# Patient Record
Sex: MALE | Race: ASIAN | NOT HISPANIC OR LATINO
[De-identification: names, ages, dates, MRNs, and addresses within clinical notes are randomized per-mention and may not be internally consistent; named-entity substitution may affect disease eponyms.]

---

## 2020-10-27 PROBLEM — Z00.00 ENCOUNTER FOR PREVENTIVE HEALTH EXAMINATION: Status: ACTIVE | Noted: 2020-10-27

## 2020-11-11 ENCOUNTER — APPOINTMENT (OUTPATIENT)
Dept: ENDOCRINOLOGY | Facility: CLINIC | Age: 60
End: 2020-11-11

## 2020-12-04 ENCOUNTER — APPOINTMENT (OUTPATIENT)
Dept: ENDOCRINOLOGY | Facility: CLINIC | Age: 60
End: 2020-12-04

## 2020-12-08 ENCOUNTER — APPOINTMENT (OUTPATIENT)
Dept: ENDOCRINOLOGY | Facility: CLINIC | Age: 60
End: 2020-12-08

## 2021-01-28 ENCOUNTER — NON-APPOINTMENT (OUTPATIENT)
Age: 61
End: 2021-01-28

## 2021-01-28 ENCOUNTER — APPOINTMENT (OUTPATIENT)
Dept: ELECTROPHYSIOLOGY | Facility: CLINIC | Age: 61
End: 2021-01-28
Payer: COMMERCIAL

## 2021-01-28 VITALS
RESPIRATION RATE: 16 BRPM | OXYGEN SATURATION: 97 % | SYSTOLIC BLOOD PRESSURE: 144 MMHG | DIASTOLIC BLOOD PRESSURE: 88 MMHG | BODY MASS INDEX: 32.21 KG/M2 | TEMPERATURE: 96.4 F | HEART RATE: 81 BPM | WEIGHT: 225 LBS | HEIGHT: 70 IN

## 2021-01-28 DIAGNOSIS — Z86.39 PERSONAL HISTORY OF OTHER ENDOCRINE, NUTRITIONAL AND METABOLIC DISEASE: ICD-10-CM

## 2021-01-28 DIAGNOSIS — Z78.9 OTHER SPECIFIED HEALTH STATUS: ICD-10-CM

## 2021-01-28 DIAGNOSIS — R06.00 DYSPNEA, UNSPECIFIED: ICD-10-CM

## 2021-01-28 PROCEDURE — 99205 OFFICE O/P NEW HI 60 MIN: CPT

## 2021-01-28 PROCEDURE — 99072 ADDL SUPL MATRL&STAF TM PHE: CPT

## 2021-01-28 PROCEDURE — 93000 ELECTROCARDIOGRAM COMPLETE: CPT

## 2021-01-28 RX ORDER — RIVAROXABAN 20 MG/1
20 TABLET, FILM COATED ORAL
Qty: 90 | Refills: 3 | Status: ACTIVE | COMMUNITY
Start: 2021-01-28

## 2021-01-28 NOTE — DISCUSSION/SUMMARY
[FreeTextEntry1] : Jayson Tim is a 59y/o man with Hx of HTN, HLD, DMII, and persistent afib, on Xarelto, who presents today for initial evaluation.\par \par Impression:\par \par 1. Persistent afib: EKG today afib. Discussed treatment options for afib including rate control vs antiarrhythmics vs possible ablation. Given persistent afib with dyspnea on exertion despite rate control management and young age/preference to avoid antiarrhythmics, recommend undergoing CAROLINE/DCCV to restore NSR and possible afib ablation in future. Risks, benefits, and alternatives discussed. \par \par 2. HTN: resume oral antihypertensives as prescribed. Encouraged heart healthy diet, sodium restriction, and weight loss. Continue regular f/u with Cardiologist for further HTN management.\par \par 3. HLD: resume statin therapy as prescribed and regular f/u with Cardiologist for routine lipid monitoring and management.\par \par Plan for CAROLINE/DCCV.

## 2021-01-28 NOTE — PHYSICAL EXAM
[General Appearance - Well Developed] : well developed [Normal Appearance] : normal appearance [Well Groomed] : well groomed [General Appearance - Well Nourished] : well nourished [No Deformities] : no deformities [General Appearance - In No Acute Distress] : no acute distress [Normal Conjunctiva] : the conjunctiva exhibited no abnormalities [Eyelids - No Xanthelasma] : the eyelids demonstrated no xanthelasmas [Normal Oral Mucosa] : normal oral mucosa [No Oral Pallor] : no oral pallor [No Oral Cyanosis] : no oral cyanosis [Normal Jugular Venous A Waves Present] : normal jugular venous A waves present [Normal Jugular Venous V Waves Present] : normal jugular venous V waves present [No Jugular Venous Perry A Waves] : no jugular venous perry A waves [Heart Sounds] : normal S1 and S2 [Murmurs] : no murmurs present [Respiration, Rhythm And Depth] : normal respiratory rhythm and effort [Exaggerated Use Of Accessory Muscles For Inspiration] : no accessory muscle use [Auscultation Breath Sounds / Voice Sounds] : lungs were clear to auscultation bilaterally [Abdomen Soft] : soft [Abdomen Tenderness] : non-tender [Abdomen Mass (___ Cm)] : no abdominal mass palpated [Abnormal Walk] : normal gait [Gait - Sufficient For Exercise Testing] : the gait was sufficient for exercise testing [Nail Clubbing] : no clubbing of the fingernails [Cyanosis, Localized] : no localized cyanosis [Petechial Hemorrhages (___cm)] : no petechial hemorrhages [Skin Color & Pigmentation] : normal skin color and pigmentation [] : no rash [No Venous Stasis] : no venous stasis [Skin Lesions] : no skin lesions [No Skin Ulcers] : no skin ulcer [No Xanthoma] : no  xanthoma was observed [Oriented To Time, Place, And Person] : oriented to person, place, and time [Affect] : the affect was normal [Mood] : the mood was normal [No Anxiety] : not feeling anxious [FreeTextEntry1] : irregular rate/rhythm

## 2021-01-28 NOTE — HISTORY OF PRESENT ILLNESS
[FreeTextEntry1] : José Manuel Campbell MD\par \par Jayson Tim is a 59y/o man with Hx of HTN, HLD, DMII, and persistent afib, on Xarelto, who presents today for initial evaluation. Admits being told his heart rate was irregular about 10 years ago back in [place]. Believes it may have been that way since then. Does have dyspnea on exertion. Denies chest pain, palpitations, SOB at rest, syncope or near syncope.\par \par

## 2021-01-28 NOTE — REVIEW OF SYSTEMS
[Eyeglasses] : currently wearing eyeglasses [Dyspnea on exertion] : dyspnea during exertion [Negative] : Heme/Lymph

## 2021-02-08 ENCOUNTER — APPOINTMENT (OUTPATIENT)
Dept: ENDOCRINOLOGY | Facility: CLINIC | Age: 61
End: 2021-02-08
Payer: COMMERCIAL

## 2021-02-08 VITALS
DIASTOLIC BLOOD PRESSURE: 82 MMHG | SYSTOLIC BLOOD PRESSURE: 124 MMHG | HEART RATE: 85 BPM | BODY MASS INDEX: 32.21 KG/M2 | WEIGHT: 225 LBS | TEMPERATURE: 97.3 F | OXYGEN SATURATION: 98 % | HEIGHT: 70 IN

## 2021-02-08 DIAGNOSIS — Z83.3 FAMILY HISTORY OF DIABETES MELLITUS: ICD-10-CM

## 2021-02-08 PROCEDURE — 95250 CONT GLUC MNTR PHYS/QHP EQP: CPT

## 2021-02-08 PROCEDURE — 99072 ADDL SUPL MATRL&STAF TM PHE: CPT

## 2021-02-08 PROCEDURE — 95251 CONT GLUC MNTR ANALYSIS I&R: CPT

## 2021-02-08 PROCEDURE — 99204 OFFICE O/P NEW MOD 45 MIN: CPT

## 2021-02-08 RX ORDER — GLIPIZIDE 10 MG/1
10 TABLET ORAL DAILY
Refills: 0 | Status: DISCONTINUED | COMMUNITY
Start: 2021-01-28 | End: 2021-02-08

## 2021-02-08 RX ORDER — INSULIN GLARGINE 100 [IU]/ML
100 INJECTION, SOLUTION SUBCUTANEOUS
Refills: 0 | Status: DISCONTINUED | COMMUNITY
Start: 2021-01-28 | End: 2021-02-08

## 2021-02-08 NOTE — HISTORY OF PRESENT ILLNESS
[FreeTextEntry1] : chief complaint: DM2\par \par Patient is a 60 year old man with PMH HTN, HLD, DM2, afib, here for evaluation of DM2. He was diagnosed with DM2 around the age of 40. A1c was 9.6% in early January 2021. He is currently on Metformin 1 gram BID, Jardiance 25 mg daily (but will be taking Farxiga 10 mg daily after finishing current bottle), Glipizide ER 10 mg daily, Trulicity 0.75 mg once weekly on Saturdays. Adherent to therapy. He was prescribed Basaglar but never started it, prefers to not take insulin. Checks BG once every 2-3 weeks, BG ranges usually in the 160's to 170's. Saw optho in early January 2021, has retinopathy, receives injections every 6 weeks, reportedly stable. Has neuropathy in the feet. No known nephropathy. \par \par Diet wise, he does like to eat rice and noodles and drinks sugary drinks like lemonade (but dilutes it). He has been trying to eat better and has cut back on the carbs. Does not exercise. \par \par For HTN, he takes Losartan 50 mg daily and Metoprolol 50 mg ER once daily, BP controlled. \par \par For HLD, he takes Lipitor 10 mg daily. \par \par He is not sure which glucometer he uses. \par \par He saw an endocrinologist in [place] previously, sometime in early 2020, prior to moving to [place] - was told at that time that he needed insulin.

## 2021-02-08 NOTE — REVIEW OF SYSTEMS
[All other systems negative] : All other systems negative [Fever] : no fever [Chills] : no chills [Blurred Vision] : no blurred vision [Dysphagia] : no dysphagia [Neck Pain] : no neck pain [Dysphonia] : no dysphonia [Chest Pain] : no chest pain [Palpitations] : no palpitations [Lower Ext Edema] : no lower extremity edema [Cough] : no cough [Shortness Of Breath] : no shortness of breath [Nausea] : no nausea [Constipation] : no constipation [Abdominal Pain] : no abdominal pain [Vomiting] : no vomiting [Diarrhea] : no diarrhea [Dysuria] : no dysuria [Polyuria] : no polyuria [Difficulty Walking] : no difficulty walking [Poor Balance] : steady state balance [Polydipsia] : no polydipsia [Cold Intolerance] : no cold intolerance [Heat Intolerance] : no heat intolerance

## 2021-02-08 NOTE — CONSULT LETTER
[Dear  ___] : Dear  [unfilled], [Consult Letter:] : I had the pleasure of evaluating your patient, [unfilled]. [( Thank you for referring [unfilled] for consultation for _____ )] : Thank you for referring [unfilled] for consultation for [unfilled] [Please see my note below.] : Please see my note below. [Consult Closing:] : Thank you very much for allowing me to participate in the care of this patient.  If you have any questions, please do not hesitate to contact me. [Sincerely,] : Sincerely, [FreeTextEntry3] : Maximus Elias MD

## 2021-02-08 NOTE — ASSESSMENT
[Carbohydrate Consistent Diet] : carbohydrate consistent diet [Importance of Diet and Exercise] : importance of diet and exercise to improve glycemic control, achieve weight loss and improve cardiovascular health [Self Monitoring of Blood Glucose] : self monitoring of blood glucose [FreeTextEntry1] : 60 year old man with uncontrolled DM2, HTN, HLD\par \par 1. DM2, uncontrolled:\par - A1c was 9.6% in 1/2021, results to be faxed to office\par - we discussed risk of worsening retinopathy on GLP1 agonist; he prefers to remain on weekly Trulicity at this time and will increase dose to 1.5 mg/week. Instructed to call office if found to have worsening retinopathy by optho\par - c/w Metformin 1 gram BID, Jardiance 25 mg daily (but will be switching to Farxiga) and Glipizide 10 mg ER once daily\par - em (CGM) placed today - based on results will make further changes to medication regimen\par - encouraged him to check BG at least 1-2 times a day and alternate the times of the day that he checks\par - has known retinopathy and follows with optho\par - check CMP and urine microalbumin/creatinine now\par - c/w dietary changes, deferred CDE visit\par \par 2. HTN: BP controlled, c/w Losartan and Metoprolol\par \par 3. HLD: c/w Lipitor 10 mg daily, check lipid panel \par \par return visit in 3 months

## 2021-02-08 NOTE — PHYSICAL EXAM
[Alert] : alert [Well Nourished] : well nourished [Healthy Appearance] : healthy appearance [No Acute Distress] : no acute distress [Normal Sclera/Conjunctiva] : normal sclera/conjunctiva [No Proptosis] : no proptosis [Normal Outer Ear/Nose] : the ears and nose were normal in appearance [No Neck Mass] : no neck mass was observed [Normal Hearing] : hearing was normal [Supple] : the neck was supple [Thyroid Not Enlarged] : the thyroid was not enlarged [No Respiratory Distress] : no respiratory distress [No Accessory Muscle Use] : no accessory muscle use [Normal Rate and Effort] : normal respiratory rate and effort [Normal S1, S2] : normal S1 and S2 [Clear to Auscultation] : lungs were clear to auscultation bilaterally [Normal Rate] : heart rate was normal [Regular Rhythm] : with a regular rhythm [No Edema] : no peripheral edema [Normal Bowel Sounds] : normal bowel sounds [Not Tender] : non-tender [Not Distended] : not distended [Soft] : abdomen soft [Spine Straight] : spine straight [Normal Gait] : normal gait [No Clubbing, Cyanosis] : no clubbing  or cyanosis of the fingernails [No Involuntary Movements] : no involuntary movements were seen [Right Foot Was Examined] : right foot ~C was examined [Left Foot Was Examined] : left foot ~C was examined [Normal] : normal [1+] : 1+ in the dorsalis pedis [Diminished Throughout Both Feet] : diminished tactile sensation with monofilament testing throughout both feet [#10 Diminished] : number 10 was diminished [No Motor Deficits] : the motor exam was normal [No Tremors] : no tremors [Oriented x3] : oriented to person, place, and time [Normal Affect] : the affect was normal [Normal Insight/Judgement] : insight and judgment were intact [Normal Mood] : the mood was normal [Kyphosis] : no kyphosis present [Acanthosis Nigricans] : no acanthosis nigricans [Foot Ulcers] : no foot ulcers [#1 Diminished] : number 1 was normal [#2 Diminished] : number 2 was normal [#3 Diminished] : number 3 was normal [#4 Diminished] : number 4 was normal [#5 Diminished] : number 5 was normal [#6 Diminished] : number 6 was normal [#7 Diminished] : number 7 was normal [#8 Diminished] : number 8 was normal [#9 Diminished] : number 9 was normal [de-identified] : decreased sensation on monofilament testing in both feet

## 2021-02-09 LAB
ALBUMIN SERPL ELPH-MCNC: 4.6 G/DL
ALP BLD-CCNC: 73 U/L
ALT SERPL-CCNC: 16 U/L
ANION GAP SERPL CALC-SCNC: 14 MMOL/L
AST SERPL-CCNC: 17 U/L
BILIRUB SERPL-MCNC: 0.6 MG/DL
BUN SERPL-MCNC: 21 MG/DL
CALCIUM SERPL-MCNC: 10.1 MG/DL
CHLORIDE SERPL-SCNC: 100 MMOL/L
CHOLEST SERPL-MCNC: 147 MG/DL
CO2 SERPL-SCNC: 22 MMOL/L
CREAT SERPL-MCNC: 1.24 MG/DL
CREAT SPEC-SCNC: 91 MG/DL
GLUCOSE SERPL-MCNC: 248 MG/DL
HDLC SERPL-MCNC: 41 MG/DL
LDLC SERPL CALC-MCNC: 67 MG/DL
MICROALBUMIN 24H UR DL<=1MG/L-MCNC: 43.5 MG/DL
MICROALBUMIN/CREAT 24H UR-RTO: 480 MG/G
NONHDLC SERPL-MCNC: 105 MG/DL
POTASSIUM SERPL-SCNC: 4.4 MMOL/L
PROT SERPL-MCNC: 7.4 G/DL
SODIUM SERPL-SCNC: 137 MMOL/L
TRIGL SERPL-MCNC: 194 MG/DL
VIT B12 SERPL-MCNC: 295 PG/ML

## 2021-02-26 ENCOUNTER — LABORATORY RESULT (OUTPATIENT)
Age: 61
End: 2021-02-26

## 2021-02-27 ENCOUNTER — APPOINTMENT (OUTPATIENT)
Dept: DISASTER EMERGENCY | Facility: CLINIC | Age: 61
End: 2021-02-27

## 2021-03-01 ENCOUNTER — APPOINTMENT (OUTPATIENT)
Dept: CV DIAGNOSITCS | Facility: HOSPITAL | Age: 61
End: 2021-03-01

## 2021-03-01 ENCOUNTER — OUTPATIENT (OUTPATIENT)
Dept: OUTPATIENT SERVICES | Facility: HOSPITAL | Age: 61
LOS: 1 days | Discharge: HOME | End: 2021-03-01
Payer: COMMERCIAL

## 2021-03-01 DIAGNOSIS — I48.19 OTHER PERSISTENT ATRIAL FIBRILLATION: ICD-10-CM

## 2021-03-01 LAB
ANION GAP SERPL CALC-SCNC: 9 MMOL/L — SIGNIFICANT CHANGE UP (ref 7–14)
BUN SERPL-MCNC: 17 MG/DL — SIGNIFICANT CHANGE UP (ref 7–23)
CALCIUM SERPL-MCNC: 9.2 MG/DL — SIGNIFICANT CHANGE UP (ref 8.4–10.5)
CHLORIDE SERPL-SCNC: 104 MMOL/L — SIGNIFICANT CHANGE UP (ref 98–107)
CO2 SERPL-SCNC: 24 MMOL/L — SIGNIFICANT CHANGE UP (ref 22–31)
CREAT SERPL-MCNC: 1.07 MG/DL — SIGNIFICANT CHANGE UP (ref 0.5–1.3)
GLUCOSE BLDC GLUCOMTR-MCNC: 217 MG/DL — HIGH (ref 70–99)
GLUCOSE SERPL-MCNC: 228 MG/DL — HIGH (ref 70–99)
HCT VFR BLD CALC: 43.2 % — SIGNIFICANT CHANGE UP (ref 39–50)
HGB BLD-MCNC: 14.4 G/DL — SIGNIFICANT CHANGE UP (ref 13–17)
MCHC RBC-ENTMCNC: 29.5 PG — SIGNIFICANT CHANGE UP (ref 27–34)
MCHC RBC-ENTMCNC: 33.3 GM/DL — SIGNIFICANT CHANGE UP (ref 32–36)
MCV RBC AUTO: 88.5 FL — SIGNIFICANT CHANGE UP (ref 80–100)
NRBC # BLD: 0 /100 WBCS — SIGNIFICANT CHANGE UP
NRBC # FLD: 0 K/UL — SIGNIFICANT CHANGE UP
PLATELET # BLD AUTO: 277 K/UL — SIGNIFICANT CHANGE UP (ref 150–400)
POTASSIUM SERPL-MCNC: 4.3 MMOL/L — SIGNIFICANT CHANGE UP (ref 3.5–5.3)
POTASSIUM SERPL-SCNC: 4.3 MMOL/L — SIGNIFICANT CHANGE UP (ref 3.5–5.3)
RBC # BLD: 4.88 M/UL — SIGNIFICANT CHANGE UP (ref 4.2–5.8)
RBC # FLD: 12.2 % — SIGNIFICANT CHANGE UP (ref 10.3–14.5)
SODIUM SERPL-SCNC: 137 MMOL/L — SIGNIFICANT CHANGE UP (ref 135–145)
WBC # BLD: 7.21 K/UL — SIGNIFICANT CHANGE UP (ref 3.8–10.5)
WBC # FLD AUTO: 7.21 K/UL — SIGNIFICANT CHANGE UP (ref 3.8–10.5)

## 2021-03-01 PROCEDURE — 92960 CARDIOVERSION ELECTRIC EXT: CPT

## 2021-03-01 PROCEDURE — 93312 ECHO TRANSESOPHAGEAL: CPT | Mod: 26

## 2021-03-01 PROCEDURE — 93010 ELECTROCARDIOGRAM REPORT: CPT

## 2021-03-01 PROCEDURE — 76376 3D RENDER W/INTRP POSTPROCES: CPT | Mod: 26

## 2021-03-01 PROCEDURE — 93325 DOPPLER ECHO COLOR FLOW MAPG: CPT | Mod: 26,GC

## 2021-03-01 PROCEDURE — 93320 DOPPLER ECHO COMPLETE: CPT | Mod: 26,GC

## 2021-03-01 RX ORDER — DULAGLUTIDE 4.5 MG/.5ML
1 INJECTION, SOLUTION SUBCUTANEOUS
Qty: 0 | Refills: 0 | DISCHARGE

## 2021-03-01 RX ORDER — RIVAROXABAN 15 MG-20MG
1 KIT ORAL
Qty: 0 | Refills: 0 | DISCHARGE

## 2021-03-01 RX ORDER — METOPROLOL TARTRATE 50 MG
1 TABLET ORAL
Qty: 0 | Refills: 0 | DISCHARGE

## 2021-03-01 RX ORDER — LOSARTAN POTASSIUM 100 MG/1
1 TABLET, FILM COATED ORAL
Qty: 0 | Refills: 0 | DISCHARGE

## 2021-03-01 RX ORDER — EMPAGLIFLOZIN 10 MG/1
1 TABLET, FILM COATED ORAL
Qty: 0 | Refills: 0 | DISCHARGE

## 2021-03-01 RX ORDER — DAPAGLIFLOZIN 10 MG/1
1 TABLET, FILM COATED ORAL
Qty: 0 | Refills: 0 | DISCHARGE

## 2021-03-01 RX ORDER — ATORVASTATIN CALCIUM 80 MG/1
1 TABLET, FILM COATED ORAL
Qty: 0 | Refills: 0 | DISCHARGE

## 2021-03-01 RX ORDER — RIVAROXABAN 15 MG-20MG
20 KIT ORAL ONCE
Refills: 0 | Status: COMPLETED | OUTPATIENT
Start: 2021-03-01 | End: 2021-03-01

## 2021-03-01 RX ORDER — METFORMIN HYDROCHLORIDE 850 MG/1
1 TABLET ORAL
Qty: 0 | Refills: 0 | DISCHARGE

## 2021-03-01 RX ADMIN — RIVAROXABAN 20 MILLIGRAM(S): KIT at 08:52

## 2021-03-01 NOTE — H&P CARDIOLOGY - PMH
Afib    Diabetes    HLD (hyperlipidemia)    HTN (hypertension)    ROXI (obstructive sleep apnea)

## 2021-03-01 NOTE — H&P CARDIOLOGY - HISTORY OF PRESENT ILLNESS
59 y/o M w/ PMH of HTN, HLD, DMII, ROXI not on CPAP and persistent Afib on Xarelto who presents today for CAROLINE/DCCV. Pt has been experiencing dyspnea on exertion. Given persistent Afib with dyspnea on exertion despite rate control management and young age/preference to avoid antiarrhythmics CAROLINE/DCCV to restore NSR was recommended

## 2021-03-10 PROBLEM — I48.91 UNSPECIFIED ATRIAL FIBRILLATION: Chronic | Status: ACTIVE | Noted: 2021-03-01

## 2021-03-10 PROBLEM — E11.9 TYPE 2 DIABETES MELLITUS WITHOUT COMPLICATIONS: Chronic | Status: ACTIVE | Noted: 2021-03-01

## 2021-03-10 PROBLEM — E78.5 HYPERLIPIDEMIA, UNSPECIFIED: Chronic | Status: ACTIVE | Noted: 2021-03-01

## 2021-03-10 PROBLEM — G47.33 OBSTRUCTIVE SLEEP APNEA (ADULT) (PEDIATRIC): Chronic | Status: ACTIVE | Noted: 2021-03-01

## 2021-03-10 PROBLEM — I10 ESSENTIAL (PRIMARY) HYPERTENSION: Chronic | Status: ACTIVE | Noted: 2021-03-01

## 2021-03-11 ENCOUNTER — APPOINTMENT (OUTPATIENT)
Dept: ELECTROPHYSIOLOGY | Facility: CLINIC | Age: 61
End: 2021-03-11

## 2021-03-12 ENCOUNTER — NON-APPOINTMENT (OUTPATIENT)
Age: 61
End: 2021-03-12

## 2021-04-01 ENCOUNTER — APPOINTMENT (OUTPATIENT)
Dept: ELECTROPHYSIOLOGY | Facility: CLINIC | Age: 61
End: 2021-04-01

## 2021-05-10 ENCOUNTER — APPOINTMENT (OUTPATIENT)
Dept: ENDOCRINOLOGY | Facility: CLINIC | Age: 61
End: 2021-05-10
Payer: COMMERCIAL

## 2021-05-10 VITALS
OXYGEN SATURATION: 99 % | BODY MASS INDEX: 31.57 KG/M2 | DIASTOLIC BLOOD PRESSURE: 84 MMHG | SYSTOLIC BLOOD PRESSURE: 126 MMHG | WEIGHT: 220 LBS | TEMPERATURE: 96.6 F | HEART RATE: 73 BPM

## 2021-05-10 LAB
GLUCOSE BLDC GLUCOMTR-MCNC: 153
HBA1C MFR BLD HPLC: 7.6

## 2021-05-10 PROCEDURE — 99072 ADDL SUPL MATRL&STAF TM PHE: CPT

## 2021-05-10 PROCEDURE — 83036 HEMOGLOBIN GLYCOSYLATED A1C: CPT | Mod: QW

## 2021-05-10 PROCEDURE — 99214 OFFICE O/P EST MOD 30 MIN: CPT | Mod: 25

## 2021-05-10 PROCEDURE — 82962 GLUCOSE BLOOD TEST: CPT

## 2021-05-10 RX ORDER — DAPAGLIFLOZIN 10 MG/1
10 TABLET, FILM COATED ORAL DAILY
Refills: 0 | Status: DISCONTINUED | COMMUNITY
Start: 2021-01-28 | End: 2021-05-10

## 2021-05-10 NOTE — PHYSICAL EXAM
[Alert] : alert [Well Nourished] : well nourished [Healthy Appearance] : healthy appearance [No Acute Distress] : no acute distress [Normal Sclera/Conjunctiva] : normal sclera/conjunctiva [No Proptosis] : no proptosis [Normal Outer Ear/Nose] : the ears and nose were normal in appearance [Normal Hearing] : hearing was normal [Supple] : the neck was supple [No Neck Mass] : no neck mass was observed [Thyroid Not Enlarged] : the thyroid was not enlarged [No Respiratory Distress] : no respiratory distress [No Accessory Muscle Use] : no accessory muscle use [Normal Rate and Effort] : normal respiratory rate and effort [Clear to Auscultation] : lungs were clear to auscultation bilaterally [Normal S1, S2] : normal S1 and S2 [Normal Rate] : heart rate was normal [Regular Rhythm] : with a regular rhythm [No Edema] : no peripheral edema [Not Tender] : non-tender [Normal Bowel Sounds] : normal bowel sounds [Not Distended] : not distended [Soft] : abdomen soft [Spine Straight] : spine straight [Normal Gait] : normal gait [No Clubbing, Cyanosis] : no clubbing  or cyanosis of the fingernails [No Involuntary Movements] : no involuntary movements were seen [Oriented x3] : oriented to person, place, and time [Normal Affect] : the affect was normal [Normal Insight/Judgement] : insight and judgment were intact [Normal Mood] : the mood was normal [Kyphosis] : no kyphosis present

## 2021-05-10 NOTE — REVIEW OF SYSTEMS
[Recent Weight Loss (___ Lbs)] : recent weight loss: [unfilled] lbs [All other systems negative] : All other systems negative [Recent Weight Gain (___ Lbs)] : no recent weight gain [Fever] : no fever [Chills] : no chills [Blurred Vision] : no blurred vision [Chest Pain] : no chest pain [Palpitations] : no palpitations [Lower Ext Edema] : no lower extremity edema [Shortness Of Breath] : no shortness of breath [Cough] : no cough [Nausea] : no nausea [Constipation] : no constipation [Abdominal Pain] : no abdominal pain [Vomiting] : no vomiting [Diarrhea] : no diarrhea [Polyuria] : no polyuria [Difficulty Walking] : no difficulty walking [Poor Balance] : steady state balance [Polydipsia] : no polydipsia [FreeTextEntry5] : had ablation for afib 3 weeks ago

## 2021-05-10 NOTE — ASSESSMENT
[Carbohydrate Consistent Diet] : carbohydrate consistent diet [Importance of Diet and Exercise] : importance of diet and exercise to improve glycemic control, achieve weight loss and improve cardiovascular health [Self Monitoring of Blood Glucose] : self monitoring of blood glucose [FreeTextEntry1] : 60 year old man with uncontrolled DM2, HTN, HLD\par \par 1. DM2, uncontrolled:\par - A1c was 9.6% in 1/2021, now improved to 7.6%\par - we again discussed risk of worsening retinopathy on GLP1 agonist; he prefers to remain on weekly Trulicity at this time as he is doing well on it. Instructed to call office if found to have worsening retinopathy by optho. Has follow up appt tmrw, if retinopathy remains stable may consider increasing dose of Trulicity to 3 mg/week at next visit if A1c still remains above goal \par - c/w Metformin 1 gram BID, Jardiance 25 mg daily, Glipizide 10 mg ER twice daily, Trulicity 1.5 mg/week\par - decrease Basaglar slightly to 23 units qhs\par - has known retinopathy and follows with optho, retinopathy stable\par - urine microalbumin/creatinine positive in 2/2021\par \par 2. HTN: BP controlled, c/w Losartan and Metoprolol\par \par 3. HLD: c/w Lipitor 10 mg daily, LDL 67 in 2/2021\par \par return visit in 3 months.

## 2021-05-10 NOTE — HISTORY OF PRESENT ILLNESS
[FreeTextEntry1] : chief complaint: DM2\par \par   Patient is a 60 year old man with PMH HTN, HLD, DM2, afib, here for evaluation of DM2. He was diagnosed with DM2 around the age of 40. A1c was 9.6% in early January 2021. At time of initial visit in 2/2021, Amanda was placed. Based on results of Amanda, he was started on Basaglar, now taking 25 units in the AM (adherent). Also Metformin 1 gram BID, Jardiance 25 mg daily, Glipizide ER 10 mg BID, Trulicity 1.5 mg once weekly on Saturdays. Adherent to therapy. Checks BG once a day, in the AM, notes that his BG is consistently in the 120's to 130's. Saw optho in early January 2021, has retinopathy, receives injections every 6 weeks, reportedly stable, to see optho again tmrw. Has neuropathy in the feet. Has nephropathy, urine microalbumin/creatinine positive in 2/2021, on ARB.   Diet wise, he does like to eat rice and noodles. He has been trying to eat better and has cut back on the carbs, states that his appetite has decreased on the Trulicity.  \par \par  For HTN, he takes Losartan 100 mg daily and Metoprolol 50 mg ER once daily, BP controlled\par \par For HLD, he takes Lipitor 10 mg daily. LDL 67 in 2/2021.\par \par Had BG this AM of 80 and felt hypoglycemic. No changes to his diet yesterday. \par \par HbA1c today improved to 7.6%.\par \par He had an episode of afib 3-4 weeks and had an ablation, now on Multaq.

## 2021-05-27 ENCOUNTER — APPOINTMENT (OUTPATIENT)
Dept: ELECTROPHYSIOLOGY | Facility: CLINIC | Age: 61
End: 2021-05-27

## 2021-06-15 ENCOUNTER — RX RENEWAL (OUTPATIENT)
Age: 61
End: 2021-06-15

## 2021-08-16 ENCOUNTER — APPOINTMENT (OUTPATIENT)
Dept: ENDOCRINOLOGY | Facility: CLINIC | Age: 61
End: 2021-08-16
Payer: COMMERCIAL

## 2021-08-23 ENCOUNTER — APPOINTMENT (OUTPATIENT)
Dept: ENDOCRINOLOGY | Facility: CLINIC | Age: 61
End: 2021-08-23
Payer: COMMERCIAL

## 2021-08-23 VITALS
OXYGEN SATURATION: 99 % | BODY MASS INDEX: 31.57 KG/M2 | TEMPERATURE: 96.5 F | HEART RATE: 88 BPM | WEIGHT: 220 LBS | SYSTOLIC BLOOD PRESSURE: 114 MMHG | DIASTOLIC BLOOD PRESSURE: 80 MMHG

## 2021-08-23 LAB — HBA1C MFR BLD HPLC: 7.3

## 2021-08-23 PROCEDURE — 83036 HEMOGLOBIN GLYCOSYLATED A1C: CPT | Mod: QW

## 2021-08-23 PROCEDURE — 99214 OFFICE O/P EST MOD 30 MIN: CPT | Mod: 25

## 2021-08-23 RX ORDER — FLASH GLUCOSE SCANNING READER
EACH MISCELLANEOUS
Qty: 1 | Refills: 0 | Status: ACTIVE | COMMUNITY
Start: 2021-08-23 | End: 1900-01-01

## 2021-08-23 RX ORDER — FLASH GLUCOSE SENSOR
KIT MISCELLANEOUS
Qty: 6 | Refills: 3 | Status: ACTIVE | COMMUNITY
Start: 2021-08-23 | End: 1900-01-01

## 2021-08-23 RX ORDER — GLIPIZIDE 10 MG/1
10 TABLET, FILM COATED, EXTENDED RELEASE ORAL
Qty: 180 | Refills: 1 | Status: DISCONTINUED | COMMUNITY
Start: 2020-09-15 | End: 2021-08-23

## 2021-08-23 NOTE — HISTORY OF PRESENT ILLNESS
[FreeTextEntry1] : chief complaint: DM2\par \par   Patient is a 61 year old man with PMH HTN, HLD, DM2, afib, here for follow up of DM2. He was diagnosed with DM2 around the age of 40. A1c was 9.6% in early January 2021. Hba1c now 7.3%. He is now on Basaglar 25 units in the AM, Metformin 1 gram BID, Jardiance 25 mg daily, Glipizide ER 10 mg once daily, Trulicity 3.0 mg once weekly on Saturdays. Adherent to therapy. Checks BG once a day, in the AM, notes that his BG is consistently in the 120's to 140s. In the last month or so, he has 2-3 BG in the day that was in the high 70s to 80s and felt low. Sees optho regularly, has retinopathy, receives injections every 6 weeks, reportedly stable, to see optho again tmrw. Has neuropathy in the feet. Has nephropathy, urine microalbumin/creatinine positive in 2/2021, on ARB. Diet wise, he does like to eat rice and noodles but he has cut the carbs in his diet.\par \par   For HTN, he takes Losartan 100 mg daily and Metoprolol 50 mg ER once daily, BP controlled\par \par   For HLD, he takes Lipitor 10 mg daily. LDL 67 in 2/2021.

## 2021-08-23 NOTE — REVIEW OF SYSTEMS
[All other systems negative] : All other systems negative [Fever] : no fever [Chills] : no chills [Blurred Vision] : no blurred vision [Chest Pain] : no chest pain [Palpitations] : no palpitations [Lower Ext Edema] : no lower extremity edema [Shortness Of Breath] : no shortness of breath [Cough] : no cough [Nausea] : no nausea [Constipation] : no constipation [Abdominal Pain] : no abdominal pain [Vomiting] : no vomiting [Diarrhea] : no diarrhea [Polyuria] : no polyuria [Difficulty Walking] : no difficulty walking [Poor Balance] : steady state balance [Polydipsia] : no polydipsia

## 2021-08-23 NOTE — PHYSICAL EXAM
[Alert] : alert [Well Nourished] : well nourished [Healthy Appearance] : healthy appearance [No Acute Distress] : no acute distress [Normal Sclera/Conjunctiva] : normal sclera/conjunctiva [No Proptosis] : no proptosis [Normal Hearing] : hearing was normal [Normal Outer Ear/Nose] : the ears and nose were normal in appearance [No Neck Mass] : no neck mass was observed [Supple] : the neck was supple [Thyroid Not Enlarged] : the thyroid was not enlarged [No Respiratory Distress] : no respiratory distress [No Accessory Muscle Use] : no accessory muscle use [Normal Rate and Effort] : normal respiratory rate and effort [Clear to Auscultation] : lungs were clear to auscultation bilaterally [Normal S1, S2] : normal S1 and S2 [Normal Rate] : heart rate was normal [Regular Rhythm] : with a regular rhythm [No Edema] : no peripheral edema [Normal Bowel Sounds] : normal bowel sounds [Not Tender] : non-tender [Not Distended] : not distended [Soft] : abdomen soft [Oriented x3] : oriented to person, place, and time [Normal Affect] : the affect was normal [Normal Insight/Judgement] : insight and judgment were intact [Normal Mood] : the mood was normal [No Clubbing, Cyanosis] : no clubbing  or cyanosis of the fingernails [No Involuntary Movements] : no involuntary movements were seen

## 2021-08-23 NOTE — ASSESSMENT
[FreeTextEntry1] : 61 year old man with mildly uncontrolled DM2, HTN, HLD\par \par 1. DM2, uncontrolled:\par - A1c was 9.6% in 1/2021, now improved to 7.3%\par - today, we again discussed risk of worsening retinopathy on GLP1 agonist and he has elected to remain on weekly Trulicity at this time as he is doing well on it. Instructed to call office if found to have worsening retinopathy by optho. \par - will adjust medications as follows: c/w Basaglar 25 units qhs, Metformin 1 gram BID, Jardiance 25 mg daily, increase Trulicity to 4.5 mg/week and stop Glipizide 10 mg ER once daily\par - has known retinopathy and follows with optho, retinopathy stable\par - urine microalbumin/creatinine positive in 2/2021, on ARB\par - check CMP and vitamin B12\par - continue with dietary changes\par - if noted to still have occasional borderline low BG after discontinuation of sulfonylurea, will plan on decreasing dose of Basaglar \par - he would like to use a Amanda, will see if covered by insurance \par \par 2. HTN: BP controlled, c/w Losartan and Metoprolol\par \par 3. HLD: c/w Lipitor 10 mg daily, LDL 67 in 2/2021\par \par Return visit in 3 months. \par 
Speaking Coherently

## 2021-08-24 LAB
ALBUMIN SERPL ELPH-MCNC: 4.3 G/DL
ALP BLD-CCNC: 69 U/L
ALT SERPL-CCNC: 14 U/L
ANION GAP SERPL CALC-SCNC: 21 MMOL/L
AST SERPL-CCNC: 19 U/L
BILIRUB SERPL-MCNC: 0.3 MG/DL
BUN SERPL-MCNC: 26 MG/DL
CALCIUM SERPL-MCNC: 9.5 MG/DL
CHLORIDE SERPL-SCNC: 101 MMOL/L
CO2 SERPL-SCNC: 17 MMOL/L
CREAT SERPL-MCNC: 1.35 MG/DL
GLUCOSE SERPL-MCNC: 223 MG/DL
POTASSIUM SERPL-SCNC: 4.7 MMOL/L
PROT SERPL-MCNC: 7.1 G/DL
SODIUM SERPL-SCNC: 139 MMOL/L
VIT B12 SERPL-MCNC: 204 PG/ML

## 2021-08-24 RX ORDER — CALCIUM CARB/VIT D3/MINERALS 600 MG-200
500 TABLET,CHEWABLE ORAL DAILY
Qty: 90 | Refills: 1 | Status: ACTIVE | COMMUNITY
Start: 2021-08-24 | End: 1900-01-01

## 2021-11-11 ENCOUNTER — APPOINTMENT (OUTPATIENT)
Dept: ENDOCRINOLOGY | Facility: CLINIC | Age: 61
End: 2021-11-11
Payer: COMMERCIAL

## 2021-11-11 ENCOUNTER — LABORATORY RESULT (OUTPATIENT)
Age: 61
End: 2021-11-11

## 2021-11-11 VITALS
HEIGHT: 70 IN | TEMPERATURE: 96.4 F | RESPIRATION RATE: 16 BRPM | HEART RATE: 78 BPM | DIASTOLIC BLOOD PRESSURE: 90 MMHG | SYSTOLIC BLOOD PRESSURE: 130 MMHG | OXYGEN SATURATION: 99 % | BODY MASS INDEX: 32.35 KG/M2 | WEIGHT: 226 LBS

## 2021-11-11 LAB
GLUCOSE BLDC GLUCOMTR-MCNC: 275
HBA1C MFR BLD HPLC: 8.2

## 2021-11-11 PROCEDURE — 82962 GLUCOSE BLOOD TEST: CPT

## 2021-11-11 PROCEDURE — 83036 HEMOGLOBIN GLYCOSYLATED A1C: CPT | Mod: QW

## 2021-11-11 PROCEDURE — 99214 OFFICE O/P EST MOD 30 MIN: CPT

## 2021-11-11 RX ORDER — PEN NEEDLE, DIABETIC 29 G X1/2"
32G X 4 MM NEEDLE, DISPOSABLE MISCELLANEOUS
Qty: 1 | Refills: 3 | Status: ACTIVE | COMMUNITY
Start: 2021-08-23 | End: 1900-01-01

## 2021-11-11 RX ORDER — METFORMIN HYDROCHLORIDE 1000 MG/1
1000 TABLET, COATED ORAL
Qty: 180 | Refills: 1 | Status: DISCONTINUED | COMMUNITY
Start: 2021-01-28 | End: 2021-11-11

## 2021-11-11 NOTE — HISTORY OF PRESENT ILLNESS
[FreeTextEntry1] : chief complaint: DM2\par \par Note that visit was truncated as pt arrived 15 minutes late.\par \par   Patient is a 61 year old man with PMH HTN, HLD, DM2, afib, here for follow up of DM2. He was diagnosed with DM2 around the age of 40. A1c was 9.6% in early January 2021. Hba1c 7.3% in 8/2021. He is now on Basaglar 25 units in the AM, Metformin 1 gram BID, Jardiance 25 mg daily,  Trulicity 4.5 mg once weekly on Saturdays (Glipizide ER 10 mg stopped 8/2021 with trulicity increase). Adherent to therapy. Checks BG once a day, in the AM, notes that his BG is consistently in the 120's to 140s. In the last month or so, he has 2-3 BG in the day that was in the high 70s to 80s and felt low. Sees optho regularly, has retinopathy, receives injections every 6 weeks, reportedly stable, to see optho again tmrw. Has neuropathy in the feet. Has nephropathy, urine microalbumin/creatinine positive in 2/2021, on ARB. Diet wise, he does like to eat rice and noodles but he has cut the carbs in his diet.\par \par   For HTN, he takes Losartan 100 mg daily and Metoprolol 50 mg ER once daily, BP controlled\par \par   For HLD, he takes Lipitor 10 mg daily. LDL 67 in 2/2021.  \par \par Interval history:\par POC , 2 hours ago had rice.\par Wants to try ER metformin since he liked it better in the past. No GI upset\par tolerating trulicity 4.5 mg weekly. No retinopathy per report\par Has not had weight loss on trulicity, appetite is less.\par Trying to cut back to rice in general, gave up sugary drinks. \par Does treadmill 30 minutes 3x per week. Trying to work out more at [employer]\par No smoking/etoh.\par \par poc 8.2 today\par \par Using freestyle em; does not have em with him today or log\par Fasting 120-130 but more recently 180-200\par before meal lunch: 160\par after meals 220\par always injects into same spot in abdomen.\par \par

## 2021-11-11 NOTE — ASSESSMENT
[Diabetes Foot Care] : diabetes foot care [Self Monitoring of Blood Glucose] : self monitoring of blood glucose [Retinopathy Screening] : Patient was referred to ophthalmology for retinopathy screening [Carbohydrate Consistent Diet] : carbohydrate consistent diet [Importance of Diet and Exercise] : importance of diet and exercise to improve glycemic control, achieve weight loss and improve cardiovascular health [Exercise/Effect on Glucose] : exercise/effect on glucose [Hypoglycemia Management] : hypoglycemia management [FreeTextEntry1] : 61 year old man with mildly uncontrolled DM2, HTN, HLD\par \par 1. DM2, uncontrolled:\par - A1c was 9.6% in 1/2021, improved to 7.3% in 8/2021 but now back up again to 8.2 after stopping glipizide\par - discussed risk of worsening retinopathy on GLP1 agonist and he has elected to remain on weekly Trulicity at this time. Instructed to call office if found to have worsening retinopathy by optho and he will send me recent report\par - will adjust medications as follows: c/w Basaglar 25 units qhs, change to Metformin ER 1 gram BID, Jardiance 25 mg daily, continue Trulicity to 4.5 mg/week and resume Glipizide 10 mg ER once daily\par - has known retinopathy and follows with optho, retinopathy stable. Would avoid ozempic in this case\par - urine microalbumin/creatinine positive in 2/2021, on ARB\par - check repeat CMP and vitamin B12\par - continue with dietary changes\par - provided titration instructions, if fasting glucoses >130, increase basaglar to 28 units after 5-7 days back on glipizide\par - continue em use, bring next visit\par - instructed pt to rotate injection sites - no signs of lipohypertrophy\par \par 2. HTN:  c/w Losartan and Metoprolol\par \par 3. HLD: c/w Lipitor 10 mg daily, LDL 67 in 2/2021\par \par Return visit in 3 months. \par

## 2021-11-11 NOTE — PHYSICAL EXAM
[Alert] : alert [Well Nourished] : well nourished [Healthy Appearance] : healthy appearance [No Acute Distress] : no acute distress [Normal Sclera/Conjunctiva] : normal sclera/conjunctiva [No Proptosis] : no proptosis [Normal Outer Ear/Nose] : the ears and nose were normal in appearance [Normal Hearing] : hearing was normal [No Neck Mass] : no neck mass was observed [Supple] : the neck was supple [Thyroid Not Enlarged] : the thyroid was not enlarged [No Respiratory Distress] : no respiratory distress [No Accessory Muscle Use] : no accessory muscle use [Normal Rate and Effort] : normal respiratory rate and effort [Clear to Auscultation] : lungs were clear to auscultation bilaterally [Normal S1, S2] : normal S1 and S2 [Normal Rate] : heart rate was normal [Regular Rhythm] : with a regular rhythm [No Edema] : no peripheral edema [Normal Bowel Sounds] : normal bowel sounds [Not Tender] : non-tender [Not Distended] : not distended [Soft] : abdomen soft [No Clubbing, Cyanosis] : no clubbing  or cyanosis of the fingernails [No Involuntary Movements] : no involuntary movements were seen [Oriented x3] : oriented to person, place, and time [Normal Affect] : the affect was normal [Normal Insight/Judgement] : insight and judgment were intact [Normal Mood] : the mood was normal [Right foot was examined, including] : right foot ~C was examined, including visual inspection with sensory and pulse exams [Left foot was examined, including] : left foot ~C was examined, including visual inspection with sensory and pulse exams [Normal] : normal [#6 Diminished] : number 6 was diminished [#1 Diminished] : number 1 was diminished [No Tremors] : no tremors [Acanthosis Nigricans] : no acanthosis nigricans [Foot Ulcers] : no foot ulcers [de-identified] : no areas of lipohypertrophy

## 2021-11-15 LAB
25(OH)D3 SERPL-MCNC: 24.2 NG/ML
ALBUMIN SERPL ELPH-MCNC: 4.7 G/DL
ALP BLD-CCNC: 76 U/L
ALT SERPL-CCNC: 15 U/L
ANION GAP SERPL CALC-SCNC: 15 MMOL/L
AST SERPL-CCNC: 19 U/L
BILIRUB SERPL-MCNC: 0.5 MG/DL
BUN SERPL-MCNC: 25 MG/DL
CALCIUM SERPL-MCNC: 9.9 MG/DL
CHLORIDE SERPL-SCNC: 100 MMOL/L
CO2 SERPL-SCNC: 23 MMOL/L
CREAT SERPL-MCNC: 1.32 MG/DL
GLUCOSE SERPL-MCNC: 273 MG/DL
POTASSIUM SERPL-SCNC: 4.2 MMOL/L
PROT SERPL-MCNC: 7.8 G/DL
SODIUM SERPL-SCNC: 138 MMOL/L
THYROGLOB AB SERPL-ACNC: <20 IU/ML
THYROPEROXIDASE AB SERPL IA-ACNC: 19.9 IU/ML
TSH SERPL-ACNC: 5.08 UIU/ML
VIT B12 SERPL-MCNC: 739 PG/ML

## 2021-11-15 RX ORDER — CHOLECALCIFEROL (VITAMIN D3) 25 MCG
25 MCG TABLET ORAL DAILY
Qty: 90 | Refills: 3 | Status: ACTIVE | COMMUNITY
Start: 2021-11-15 | End: 1900-01-01

## 2022-02-14 ENCOUNTER — APPOINTMENT (OUTPATIENT)
Dept: ENDOCRINOLOGY | Facility: CLINIC | Age: 62
End: 2022-02-14

## 2022-04-25 ENCOUNTER — APPOINTMENT (OUTPATIENT)
Dept: ENDOCRINOLOGY | Facility: CLINIC | Age: 62
End: 2022-04-25
Payer: COMMERCIAL

## 2022-04-25 VITALS
DIASTOLIC BLOOD PRESSURE: 82 MMHG | TEMPERATURE: 97.5 F | BODY MASS INDEX: 31.57 KG/M2 | HEART RATE: 74 BPM | SYSTOLIC BLOOD PRESSURE: 130 MMHG | OXYGEN SATURATION: 96 % | WEIGHT: 220 LBS

## 2022-04-25 LAB — HBA1C MFR BLD HPLC: 7.9

## 2022-04-25 PROCEDURE — 99214 OFFICE O/P EST MOD 30 MIN: CPT | Mod: 25

## 2022-04-25 PROCEDURE — 83036 HEMOGLOBIN GLYCOSYLATED A1C: CPT | Mod: QW

## 2022-04-25 NOTE — HISTORY OF PRESENT ILLNESS
[FreeTextEntry1] : CC: DM2\par  Patient is a 61 year old man with PMH HTN, HLD, DM2, afib, here for follow up of DM2. He was diagnosed with DM2 around the age of 40. A1c was 9.6% in early January 2021. Hba1c 7.3% in 8/2021. \par Sees optho regularly, has retinopathy, receives injections every 6 weeks, reportedly stable.\par Has neuropathy in the feet. \par Has nephropathy, urine microalbumin/creatinine positive in 2/2021, on ARB. Diet wise, he does like to eat rice and noodles but he has cut the carbs in his diet.\par \par Interval history:\par Feels like sugars are better.\par Recently retired. More relaxed.\par Did not bring meter or em with him. Reports sugars mostly in range, but after eating sugars go high, trying to eat less and work on portion size. Cut out a lot of starches - no more rice. Rarely will have toast. No juice or soda. Feels well\par \par poc 8.2 11/2021 --> 7.9% today\par \par Current regimen:\par Basaglar 25 units qhs \par Metformin 1 gram BID\par Jardiance 25 mg daily\par Trulicity 4.5 mg/week \par Glipizide 10 mg ER once daily\par \par Using freestyle em; does not have em with him today or log; by memory\par Fasting 140-160\par before meal lunch: 180\par only scanning 2x daily \par No hypos\par rotating injections sites\par \par Not exercising. Now retired and will start working out; has equipment at home\par \par  For HTN, he takes Losartan 100 mg daily and Metoprolol 50 mg ER once daily, BP controlled\par \par   For HLD, he takes Lipitor 10 mg daily. LDL 67 in 2/2021.  \par \par Subclinical hypothyroidism\par TSH 5 in 11/2022, normal FT4. TPO ab negative.\par Asymptomatic\par \par

## 2022-04-25 NOTE — REVIEW OF SYSTEMS
[All other systems negative] : All other systems negative [Fever] : no fever [Chills] : no chills [Blurred Vision] : no blurred vision [Chest Pain] : no chest pain none [Palpitations] : no palpitations [Lower Ext Edema] : no lower extremity edema [Shortness Of Breath] : no shortness of breath [Cough] : no cough [Nausea] : no nausea [Constipation] : no constipation [Abdominal Pain] : no abdominal pain [Vomiting] : no vomiting [Diarrhea] : no diarrhea [Polyuria] : no polyuria [Difficulty Walking] : no difficulty walking [Poor Balance] : steady state balance [Polydipsia] : no polydipsia

## 2022-04-25 NOTE — ASSESSMENT
[Diabetes Foot Care] : diabetes foot care [Carbohydrate Consistent Diet] : carbohydrate consistent diet [Importance of Diet and Exercise] : importance of diet and exercise to improve glycemic control, achieve weight loss and improve cardiovascular health [Exercise/Effect on Glucose] : exercise/effect on glucose [Hypoglycemia Management] : hypoglycemia management [Self Monitoring of Blood Glucose] : self monitoring of blood glucose [Retinopathy Screening] : Patient was referred to ophthalmology for retinopathy screening [FreeTextEntry1] : 61 year old man with mildly uncontrolled DM2, HTN, HLD\par \par 1. DM2, uncontrolled:\par - A1c 7.9%, improved but above goal\par - discussed risk of worsening retinopathy on GLP1 agonist and he has elected to remain on weekly Trulicity at this time. Instructed to call office if found to have worsening retinopathy by optho\par - will adjust medications as follows: increase Basaglar 28 units qhs, continue metformin 1 gram BID, Jardiance 25 mg daily, continue Trulicity to 4.5 mg/week and continue Glipizide 10 mg ER once daily\par - has known retinopathy and follows with optho, retinopathy stable. Would avoid ozempic in this case\par - urine microalbumin/creatinine positive in 2/2021, on ARB, repeat today\par - check repeat CMP and vitamin B12\par - continue with dietary changes. Discussed increasing exercise to 30 minutes 5 days a week of aerobic activity, add 1-2 days strengthening exercises as tolerated\par - continue em use, bring next visit\par - instructed pt to rotate injection sites\par \par 2. HTN:  c/w Losartan and Metoprolol\par \par 3. HLD: c/w Lipitor 10 mg daily, LDL 67 in 2/2021\par \par 4. Subclinical hypothyroidism\par - repeat TFTs. TPO ab negative in 11/2021\par \par 5. Vit d deficiency\par repeat level today\par \par Return visit in 4 months. \par  [Weight Loss] : weight loss

## 2022-04-25 NOTE — PHYSICAL EXAM
[Alert] : alert [Well Nourished] : well nourished [Healthy Appearance] : healthy appearance [No Acute Distress] : no acute distress [Normal Sclera/Conjunctiva] : normal sclera/conjunctiva [No Proptosis] : no proptosis [Normal Outer Ear/Nose] : the ears and nose were normal in appearance [Normal Hearing] : hearing was normal [No Neck Mass] : no neck mass was observed [Supple] : the neck was supple [Thyroid Not Enlarged] : the thyroid was not enlarged [No Respiratory Distress] : no respiratory distress [No Accessory Muscle Use] : no accessory muscle use [Normal Rate and Effort] : normal respiratory rate and effort [Clear to Auscultation] : lungs were clear to auscultation bilaterally [Normal S1, S2] : normal S1 and S2 [Normal Rate] : heart rate was normal [Regular Rhythm] : with a regular rhythm [No Edema] : no peripheral edema [Normal Bowel Sounds] : normal bowel sounds [Not Tender] : non-tender [Not Distended] : not distended [Soft] : abdomen soft [No Clubbing, Cyanosis] : no clubbing  or cyanosis of the fingernails [No Involuntary Movements] : no involuntary movements were seen [Normal] : normal [#6 Diminished] : number 6 was diminished [#1 Diminished] : number 1 was diminished [No Tremors] : no tremors [Oriented x3] : oriented to person, place, and time [Normal Affect] : the affect was normal [Normal Insight/Judgement] : insight and judgment were intact [Normal Mood] : the mood was normal [Acanthosis Nigricans] : no acanthosis nigricans [Foot Ulcers] : no foot ulcers [Right foot was examined, including] : right foot ~C was examined, including visual inspection with sensory and pulse exams [Left foot was examined, including] : left foot ~C was examined, including visual inspection with sensory and pulse exams [de-identified] : no areas of lipohypertrophy

## 2022-04-29 LAB
25(OH)D3 SERPL-MCNC: 24.9 NG/ML
ALBUMIN SERPL ELPH-MCNC: 4.6 G/DL
ALP BLD-CCNC: 82 U/L
ALT SERPL-CCNC: 13 U/L
ANION GAP SERPL CALC-SCNC: 13 MMOL/L
AST SERPL-CCNC: 17 U/L
BILIRUB SERPL-MCNC: 0.4 MG/DL
BUN SERPL-MCNC: 21 MG/DL
CALCIUM SERPL-MCNC: 10 MG/DL
CHLORIDE SERPL-SCNC: 103 MMOL/L
CHOLEST SERPL-MCNC: 139 MG/DL
CO2 SERPL-SCNC: 25 MMOL/L
CREAT SERPL-MCNC: 1.21 MG/DL
CREAT SPEC-SCNC: 89 MG/DL
EGFR: 68 ML/MIN/1.73M2
GLUCOSE SERPL-MCNC: 229 MG/DL
HDLC SERPL-MCNC: 44 MG/DL
LDLC SERPL CALC-MCNC: 65 MG/DL
MICROALBUMIN 24H UR DL<=1MG/L-MCNC: 31.8 MG/DL
MICROALBUMIN/CREAT 24H UR-RTO: 359 MG/G
NONHDLC SERPL-MCNC: 95 MG/DL
POTASSIUM SERPL-SCNC: 4.2 MMOL/L
PROT SERPL-MCNC: 7.6 G/DL
SODIUM SERPL-SCNC: 140 MMOL/L
T4 FREE SERPL-MCNC: 1.4 NG/DL
TRIGL SERPL-MCNC: 152 MG/DL
TSH SERPL-ACNC: 2.61 UIU/ML
VIT B12 SERPL-MCNC: 885 PG/ML

## 2022-05-31 ENCOUNTER — RX RENEWAL (OUTPATIENT)
Age: 62
End: 2022-05-31

## 2022-07-20 ENCOUNTER — RX RENEWAL (OUTPATIENT)
Age: 62
End: 2022-07-20

## 2022-09-02 ENCOUNTER — APPOINTMENT (OUTPATIENT)
Dept: NEPHROLOGY | Facility: CLINIC | Age: 62
End: 2022-09-02

## 2022-09-02 VITALS
SYSTOLIC BLOOD PRESSURE: 127 MMHG | WEIGHT: 233 LBS | HEART RATE: 43 BPM | OXYGEN SATURATION: 98 % | DIASTOLIC BLOOD PRESSURE: 85 MMHG | BODY MASS INDEX: 33.36 KG/M2 | HEIGHT: 70 IN | TEMPERATURE: 96.7 F

## 2022-09-02 DIAGNOSIS — G47.33 OBSTRUCTIVE SLEEP APNEA (ADULT) (PEDIATRIC): ICD-10-CM

## 2022-09-02 DIAGNOSIS — I48.19 OTHER PERSISTENT ATRIAL FIBRILLATION: ICD-10-CM

## 2022-09-02 DIAGNOSIS — R80.9 PROTEINURIA, UNSPECIFIED: ICD-10-CM

## 2022-09-02 DIAGNOSIS — I10 ESSENTIAL (PRIMARY) HYPERTENSION: ICD-10-CM

## 2022-09-02 PROCEDURE — 99203 OFFICE O/P NEW LOW 30 MIN: CPT

## 2022-09-02 RX ORDER — METOPROLOL SUCCINATE 50 MG/1
50 TABLET, EXTENDED RELEASE ORAL
Qty: 30 | Refills: 2 | Status: DISCONTINUED | COMMUNITY
Start: 2021-01-28 | End: 2022-09-02

## 2022-09-02 NOTE — REASON FOR VISIT
[Consultation] : a consultation visit [FreeTextEntry1] : Referred by Dr. Valencia Knight for proteinuria in the setting of type II DM.

## 2022-09-02 NOTE — CONSULT LETTER
[Dear  ___] : Dear  [unfilled], [Consult Letter:] : I had the pleasure of evaluating your patient, [unfilled]. [Please see my note below.] : Please see my note below. [Consult Closing:] : Thank you very much for allowing me to participate in the care of this patient.  If you have any questions, please do not hesitate to contact me. [Sincerely,] : Sincerely, [FreeTextEntry2] : Dr. Valencia Knight [FreeTextEntry1] : Patient currently on gold standard regimen of ARB and SGLT2 inhibitor.  May benefit from Finerenone (Kerendia) if proteinuria worsens.  Some data on concomitant use of SGLT2 inhibitor and Eplerenone.  Consideration for the future.  Needs evaluation for Sleep Apnea. Referred to Dr. Rachid Coon. \par Thanks.  [FreeTextEntry3] : Telly Espinoza MD\par Cell [phone]

## 2022-09-02 NOTE — ASSESSMENT
[FreeTextEntry1] : The patient is aware of the complications of diabetes including CAD, atrial fibrillation, retinopathy and neuropathy. He is here to discuss best regimen to prevent renal disease. Made aware that albuminuria is the earliest sign of diabetic renal disease.\par 1. Albuminuria in a patient with a 20 year history of type II DM and with retinopathy most likely related to early changes of diabetic nephropathy. Obesity and sleep apnea can also cause proteinuria. Discussed that at present the patient's BP is well controlled, his lipids are controlled on Atorvastatin, but his A1c is out of range. We also discussed that the patient is on an ARB and on a SGLT2 inhibitor. Both these agents are currently felt to be the best nephroprotective strategy.  We also discussed Kerendia (Finerenone) a non steroidal mineralocorticoid receptor blocker recently found effective in decreasing cardiovascular and renal risk. At this point would opt not to use this agent, but to consider it if proteinuria worsens or renal function deteriorates. \par 2. Hypertension: well controlled.\par 3. Type II DM. Follow up w/ Dr. Knight.\par 4 Atrial fibrillation. Discussed how sleep apnea may be a contributing factor. \par 5. Rule out sleep apnea. Referred to Sleep Lab and Dr. Rachid Coon.\par

## 2022-09-02 NOTE — HISTORY OF PRESENT ILLNESS
[FreeTextEntry1] : Chart reviewed in [facility]. The patient is an excellent historian. Background: 20 year plus of type II DM, hypertension and retinopathy treated w/ vitreal injections.  Prior history of atrial fibrillation, status post ablation, now still has atrial fibrillation 30% of the time (loop recorder).  The patient had a albumin/creatinine urinary ratio of 480 in February of 2022 and of 359 in April of 2022.  \par The purpose of this visit is to address the best regimen to prevent/delay onset and progression of diabetic kidney disease.  Other issue: the patient is aware of snoring and at times he wakes up tired.  He is asking for a referral to a sleep center to r/o sleep apnea.

## 2022-09-02 NOTE — PHYSICAL EXAM
[General Appearance - Alert] : alert [Sclera] : the sclera and conjunctiva were normal [Hearing Threshold Finger Rub Not [place]] : hearing was normal [Jugular Venous Distention Increased] : there was no jugular-venous distention [Auscultation Breath Sounds / Voice Sounds] : lungs were clear to auscultation bilaterally [Heart Sounds] : normal S1 and S2 [Edema] : there was no peripheral edema [Abdomen Tenderness] : non-tender [] : no hepato-splenomegaly [Abnormal Walk] : normal gait [Skin Color & Pigmentation] : normal skin color and pigmentation [No Focal Deficits] : no focal deficits [Oriented To Time, Place, And Person] : oriented to person, place, and time [FreeTextEntry1] : high neck circumference

## 2022-10-26 RX ORDER — INSULIN GLARGINE 100 [IU]/ML
100 INJECTION, SOLUTION SUBCUTANEOUS
Qty: 2 | Refills: 3 | Status: DISCONTINUED | COMMUNITY
Start: 2021-08-23 | End: 2022-10-26

## 2022-11-14 ENCOUNTER — APPOINTMENT (OUTPATIENT)
Dept: ENDOCRINOLOGY | Facility: CLINIC | Age: 62
End: 2022-11-14

## 2023-03-03 RX ORDER — FLASH GLUCOSE SCANNING READER
EACH MISCELLANEOUS
Qty: 1 | Refills: 0 | Status: ACTIVE | COMMUNITY
Start: 2022-09-08 | End: 1900-01-01

## 2023-04-27 RX ORDER — DULAGLUTIDE 4.5 MG/.5ML
4.5 INJECTION, SOLUTION SUBCUTANEOUS
Qty: 3 | Refills: 1 | Status: ACTIVE | COMMUNITY
Start: 2021-01-28 | End: 1900-01-01

## 2023-05-01 ENCOUNTER — APPOINTMENT (OUTPATIENT)
Dept: ENDOCRINOLOGY | Facility: CLINIC | Age: 63
End: 2023-05-01
Payer: COMMERCIAL

## 2023-05-01 VITALS
BODY MASS INDEX: 30.49 KG/M2 | SYSTOLIC BLOOD PRESSURE: 120 MMHG | HEIGHT: 70 IN | HEART RATE: 93 BPM | DIASTOLIC BLOOD PRESSURE: 78 MMHG | OXYGEN SATURATION: 99 % | WEIGHT: 213 LBS

## 2023-05-01 LAB
GLUCOSE BLDC GLUCOMTR-MCNC: 276
HBA1C MFR BLD HPLC: 8.4

## 2023-05-01 PROCEDURE — 83036 HEMOGLOBIN GLYCOSYLATED A1C: CPT | Mod: QW

## 2023-05-01 PROCEDURE — 99214 OFFICE O/P EST MOD 30 MIN: CPT | Mod: 25

## 2023-05-01 PROCEDURE — 82962 GLUCOSE BLOOD TEST: CPT

## 2023-05-01 NOTE — ASSESSMENT
[FreeTextEntry1] : 62 year old man with mildly uncontrolled DM2, HTN, HLD. \par \par 1. DM2, uncontrolled:\par  A1c 5/1/2023: 8.4%\par The em is downloaded however there are only 3 days of data, the morning fasting numbers appear to be running in the 160s to 200s, the patient often runs high after meals into the mid 200s\par There is not enough data to get an accurate estimation of the patient's glucose control.\par C-peptide and antibodies to assess for pancreatic reserve, the patient is having significant postprandial glucose excursions on multiple medications.\par \par Plan:\par Metformin 1 gram BID\par Jardiance 25 mg daily\par Glipizide 10 mg ER once daily  \par Increase Ozempic to 2 mg once weekly.\par Restart Basaglar 12 units daily. \par \par   I will check his em in 2 to 3 weeks.\par Next Monday he has eye doctor. He understands risk for worsening retinopathy. Stable retinopathy. \par - discussed risk of worsening retinopathy on GLP1 agonist and he has elected to remain on weekly Trulicity at this time. Instructed to call office if found to have worsening retinopathy by optho\par - has known retinopathy and follows with optho, retinopathy stable.\par - urine microalbumin/creatinine positive in 4/2022, on ARB and SGLT2 inhibitor, repeat today\par - check repeat CMP and vitamin B12\par - continue with dietary changes. Discussed increasing exercise to 30 minutes 5 days a week of aerobic activity, add 1-2 days strengthening exercises as tolerated\par - continue em use, bring next visit, scan regularly. \par - instructed pt to rotate injection sites\par \par -Patient requires a therapeutic CGM\par -Patient has diabetes mellitus\par -Patient has been using a home blood glucose monitor and performing frequent (four times a day) testing, 6 months\par -Patient is being insulin-treated with multiple daily injections (MDI) of insulin x4day, x 6months \par -Patient insulin treatment regimen requires frequent adjustments by the Patient on the basis of therapeutic CGM testing results.\par \par 2. HTN:  c/w Losartan and Metoprolol\par  blood pressure is at goal today 120/78 mmHg\par \par 3. HLD: c/w Lipitor 10 mg daily, LDL   65 4/29/2022\par \par 4. Subclinical hypothyroidism\par - repeat TFTs. TPO ab negative in 11/2021\par \par 5. Vit d deficiency\par repeat level today\par \par  Return to care as scheduled in 6 months.\par   Check labs today.

## 2023-05-01 NOTE — HISTORY OF PRESENT ILLNESS
[FreeTextEntry1] : Patient is a 61 year old man with PMH HTN, HLD, DM2, afib, here for follow up of DM2. He was diagnosed with DM2 around the age of 40. A1c was 9.6% in early January 2021. Hba1c 7.3% in 8/2021. \par \par Sees optho regularly, has retinopathy, receives injections every 6 weeks, reportedly stable.\par Has neuropathy in the feet. \par Has nephropathy, urine microalbumin/creatinine positive in 2/2021, on ARB. \par Diet: he does like to eat rice and noodles but He has significantly reduce the carbohydrates in his diet.\par \par  A1c 5/1/2023: 8.4%\par He lives in [place] now, he says in [place] he eats bagels, orange chicken, and pizza and this make his glucose go up. \par His diet is much better when he is in [place]. \par No interval illness, no hospitalizations. \par \par Current regimen:\par Metformin 1 gram BID\par Jardiance 25 mg daily\par Glipizide 10 mg ER once daily\par Ozempic (he has lost weight on this) 1 mg once weekly- advised caution for retinopathy. \par \par Previous Medications:\par Trulicity 4.5 mg/week \par Basaglar 28 units qhs -- he stopped it all together- 4 weeks ago. \par \par Glucose monitoring:\par Today for breakfast he had a Turkish, he had a cinnamon roll, then a chocolate gram cookie, he says his sugar are in the 120s consistently. \par The em is downloaded however there are only 3 days of data, the morning fasting numbers appear to be running in the 160s to 200s, the patient often runs high after meals into the mid 200s\par There is not enough data to get an accurate estimation of the patient's glucose control.\par \par Not exercising. Now retired and will start working out; has equipment at home.\par \par For HTN, he takes Losartan 100 mg daily and Metoprolol 50 mg ER once daily, BP controlled\par  For HLD, he takes Lipitor 10 mg daily. LDL 67 in 2/2021.  \par Subclinical hypothyroidism: TSH 5 in 11/2022, normal FT4. TPO ab negative. Asymptomatic

## 2023-05-01 NOTE — PHYSICAL EXAM
[Alert] : alert [Well Nourished] : well nourished [Healthy Appearance] : healthy appearance [No Acute Distress] : no acute distress [No Neck Mass] : no neck mass was observed [Supple] : the neck was supple [Thyroid Not Enlarged] : the thyroid was not enlarged [No Respiratory Distress] : no respiratory distress [No Accessory Muscle Use] : no accessory muscle use [Normal Rate and Effort] : normal respiratory rate and effort [Clear to Auscultation] : lungs were clear to auscultation bilaterally [Normal S1, S2] : normal S1 and S2 [Normal Rate] : heart rate was normal [Regular Rhythm] : with a regular rhythm [Normal Bowel Sounds] : normal bowel sounds [No Edema] : no peripheral edema [Not Tender] : non-tender [Not Distended] : not distended [Soft] : abdomen soft [No Clubbing, Cyanosis] : no clubbing  or cyanosis of the fingernails [Right foot was examined, including] : right foot ~C was examined, including visual inspection with sensory and pulse exams [Left foot was examined, including] : left foot ~C was examined, including visual inspection with sensory and pulse exams [Normal] : normal [#6 Diminished] : number 6 was diminished [#1 Diminished] : number 1 was diminished [Oriented x3] : oriented to person, place, and time [Normal Affect] : the affect was normal [Normal Insight/Judgement] : insight and judgment were intact [Normal Mood] : the mood was normal [Acanthosis Nigricans] : no acanthosis nigricans [Foot Ulcers] : no foot ulcers [de-identified] : no areas of lipohypertrophy

## 2023-05-02 LAB
25(OH)D3 SERPL-MCNC: 18.9 NG/ML
ALBUMIN SERPL ELPH-MCNC: 4.6 G/DL
ALP BLD-CCNC: 90 U/L
ALT SERPL-CCNC: 12 U/L
ANION GAP SERPL CALC-SCNC: 15 MMOL/L
AST SERPL-CCNC: 14 U/L
BILIRUB SERPL-MCNC: 0.5 MG/DL
BUN SERPL-MCNC: 25 MG/DL
C PEPTIDE SERPL-MCNC: 5.1 NG/ML
CALCIUM SERPL-MCNC: 10.5 MG/DL
CHLORIDE SERPL-SCNC: 101 MMOL/L
CHOLEST SERPL-MCNC: 176 MG/DL
CO2 SERPL-SCNC: 23 MMOL/L
CREAT SERPL-MCNC: 1.69 MG/DL
CREAT SPEC-SCNC: 281 MG/DL
EGFR: 45 ML/MIN/1.73M2
GLUCOSE SERPL-MCNC: 372 MG/DL
HDLC SERPL-MCNC: 42 MG/DL
LDLC SERPL CALC-MCNC: 82 MG/DL
MICROALBUMIN 24H UR DL<=1MG/L-MCNC: 98 MG/DL
MICROALBUMIN/CREAT 24H UR-RTO: 348 MG/G
NONHDLC SERPL-MCNC: 135 MG/DL
POTASSIUM SERPL-SCNC: 5.4 MMOL/L
PROT SERPL-MCNC: 7.8 G/DL
SODIUM SERPL-SCNC: 138 MMOL/L
TRIGL SERPL-MCNC: 265 MG/DL
VIT B12 SERPL-MCNC: 775 PG/ML

## 2023-05-04 ENCOUNTER — NON-APPOINTMENT (OUTPATIENT)
Age: 63
End: 2023-05-04

## 2023-05-04 LAB — PANC ISLET CELL AB SER QL: NORMAL

## 2023-05-08 ENCOUNTER — NON-APPOINTMENT (OUTPATIENT)
Age: 63
End: 2023-05-08

## 2023-05-08 LAB
GAD65 AB SER-MCNC: 0.01 NMOL/L
ISLET CELL512 AB SER-SCNC: 0 NMOL/L

## 2023-05-12 ENCOUNTER — NON-APPOINTMENT (OUTPATIENT)
Age: 63
End: 2023-05-12

## 2023-05-12 LAB — ZINC TRANSPORTER 8 AB: <15 U/ML

## 2023-05-17 ENCOUNTER — NON-APPOINTMENT (OUTPATIENT)
Age: 63
End: 2023-05-17

## 2023-10-09 ENCOUNTER — RX RENEWAL (OUTPATIENT)
Age: 63
End: 2023-10-09

## 2023-10-23 RX ORDER — FLASH GLUCOSE SENSOR
KIT MISCELLANEOUS
Qty: 6 | Refills: 5 | Status: ACTIVE | COMMUNITY
Start: 2022-09-08 | End: 1900-01-01

## 2023-11-06 ENCOUNTER — APPOINTMENT (OUTPATIENT)
Dept: ENDOCRINOLOGY | Facility: CLINIC | Age: 63
End: 2023-11-06

## 2023-11-10 ENCOUNTER — RX RENEWAL (OUTPATIENT)
Age: 63
End: 2023-11-10

## 2024-01-19 ENCOUNTER — APPOINTMENT (OUTPATIENT)
Dept: ENDOCRINOLOGY | Facility: CLINIC | Age: 64
End: 2024-01-19
Payer: COMMERCIAL

## 2024-01-19 VITALS
OXYGEN SATURATION: 99 % | HEIGHT: 70 IN | WEIGHT: 203 LBS | SYSTOLIC BLOOD PRESSURE: 120 MMHG | HEART RATE: 88 BPM | DIASTOLIC BLOOD PRESSURE: 82 MMHG | BODY MASS INDEX: 29.06 KG/M2

## 2024-01-19 DIAGNOSIS — E53.8 DEFICIENCY OF OTHER SPECIFIED B GROUP VITAMINS: ICD-10-CM

## 2024-01-19 DIAGNOSIS — Z23 ENCOUNTER FOR IMMUNIZATION: ICD-10-CM

## 2024-01-19 DIAGNOSIS — E11.9 TYPE 2 DIABETES MELLITUS W/OUT COMPLICATIONS: ICD-10-CM

## 2024-01-19 DIAGNOSIS — E78.5 HYPERLIPIDEMIA, UNSPECIFIED: ICD-10-CM

## 2024-01-19 DIAGNOSIS — E55.9 VITAMIN D DEFICIENCY, UNSPECIFIED: ICD-10-CM

## 2024-01-19 DIAGNOSIS — E03.8 OTHER SPECIFIED HYPOTHYROIDISM: ICD-10-CM

## 2024-01-19 LAB — HBA1C MFR BLD HPLC: 9.7

## 2024-01-19 PROCEDURE — 99214 OFFICE O/P EST MOD 30 MIN: CPT | Mod: 25

## 2024-01-19 PROCEDURE — 83036 HEMOGLOBIN GLYCOSYLATED A1C: CPT | Mod: QW

## 2024-01-19 PROCEDURE — 90686 IIV4 VACC NO PRSV 0.5 ML IM: CPT

## 2024-01-19 PROCEDURE — G0008: CPT

## 2024-01-19 RX ORDER — SEMAGLUTIDE 2.68 MG/ML
8 INJECTION, SOLUTION SUBCUTANEOUS
Qty: 3 | Refills: 2 | Status: DISCONTINUED | COMMUNITY
Start: 2023-05-01 | End: 2024-01-19

## 2024-01-19 RX ORDER — EMPAGLIFLOZIN 25 MG/1
25 TABLET, FILM COATED ORAL DAILY
Qty: 90 | Refills: 1 | Status: ACTIVE | COMMUNITY
Start: 2021-01-28 | End: 1900-01-01

## 2024-01-19 NOTE — PHYSICAL EXAM
[Alert] : alert [Well Nourished] : well nourished [No Acute Distress] : no acute distress [No Respiratory Distress] : no respiratory distress [No Accessory Muscle Use] : no accessory muscle use [Clear to Auscultation] : lungs were clear to auscultation bilaterally [Normal PMI] : the apical impulse was normal [Normal S1, S2] : normal S1 and S2 [Normal Rate] : heart rate was normal [Oriented x3] : oriented to person, place, and time [Normal Affect] : the affect was normal [Normal Insight/Judgement] : insight and judgment were intact

## 2024-01-19 NOTE — HISTORY OF PRESENT ILLNESS
[FreeTextEntry1] : Patient is a 63 year old man with PMH HTN, HLD, DM2, afib, here for follow up of DM2. He was diagnosed with DM2 around the age of 40. A1c was 9.6% in early January 2021. Hba1c 7.3% in 8/2021.  Sees ophtho regularly, has retinopathy, receives injections every 6 weeks, reportedly stable.  Has neuropathy in the feet.  Has nephropathy, urine microalbumin/creatinine positive in 2/2021, on ARB.  Diet: he does like to eat rice and noodles but He has significantly reduce the carbohydrates in his diet.  A1c 5/1/2023: 8.4% A1c 1/19/24: 9.7%  He lives in [place] now, he says in [place] he eats bagels, orange chicken, and pizza and this make his glucose go up.  His diet is much better when he is in [place].  No interval illness, no hospitalizations.  Current regimen: Metformin 1 gram once daily Jardiance 25 mg daily Glipizide 10 mg ER once daily Ozempic (he has lost weight on this) 2 mg once weekly- advised caution for retinopathy. (taking 3 mg once weekly from [place]). go down to 2mg weekly.  Basaglar 14 units daily  Previous Medications: Trulicity 4.5 mg/week Basaglar 28 units qhs -- he stopped it all together- 4 weeks ago.  Glucose monitoring: Amanda is downloaded but the amanda data is only from November 2023.  Demonstrating target 50% high 42% very high 8%, no recent glucose data.  Not exercising. Now retired and will start working out; has equipment at home.  For HTN, he takes Losartan 100 mg daily and Metoprolol 50 mg ER once daily, BP controlled  For HLD, he takes Lipitor 10 mg daily.  LDL 82 5/2023, triglycerides 265.  Subclinical hypothyroidism: TSH 5 in 11/2022, normal FT4. TPO ab negative. Asymptomatic. TFTs normal May 2023 check patient is experiencing some fatigue.

## 2024-01-19 NOTE — ASSESSMENT
[FreeTextEntry1] : 63 year old man with mildly uncontrolled DM2, HTN, HLD.  He is feeling fatigued. Can check testosterone at the next visit.   1. DM2, uncontrolled:  A1c 5/1/2023: 8.4% A1c 1/19/23: 9.7%  C-peptide and antibodies to assess for pancreatic reserve were normal.   I will check his amanda in 2 to 3 weeks. RESTART Amanda sensor.  Plan: Metformin 1 gram once daily Jardiance 25 mg daily Glipizide 10 mg ER once daily Stop Ozempic (he has lost weight on this) 2 mg once weekly- advised caution for retinopathy. Start Mounjaro 5 mg once weekly x 4 weeks, and then increase to 7.5 mg once weekly. then portal message me in 2 months.  Increase Basaglar to 16 units daily  Next Monday he has eye doctor. He understands risk for worsening retinopathy. Stable retinopathy. Stable retinopathy went on 1/9/24.   - discussed risk of worsening retinopathy on GLP1 agonist  - has known retinopathy and follows with optho, retinopathy stable. - urine microalbumin/creatinine positive in 4/2022, on ARB and SGLT2 inhibitor, repeat today - check repeat CMP and vitamin B12 - continue with dietary changes. Discussed increasing exercise to 30 minutes 5 days a week of aerobic activity, add 1-2 days strengthening exercises as tolerated - continue amanda use, bring next visit, scan regularly. - instructed pt to rotate injection sites   -Patient requires a therapeutic CGM -Patient has diabetes mellitus -Patient has been using a home blood glucose monitor and performing frequent (four times a day) testing, 6 months -Patient is being insulin-treated with multiple daily injections (MDI) of insulin x4day, x 6months -Patient insulin treatment regimen requires frequent adjustments by the Patient on the basis of therapeutic CGM testing results.  2. HTN: c/w Losartan and Metoprolol  blood pressure is at goal today  3. HLD: c/w Lipitor 10 mg daily, LDL 82  4. Subclinical hypothyroidism - repeat TFTs. TPO ab negative in 11/2021 -FEELING FATIGUED, can trial low dose LT4 if levels are low.   5. Vit d deficiency repeat level today   Return to care as scheduled in 6 months.  Check labs today.

## 2024-01-23 ENCOUNTER — NON-APPOINTMENT (OUTPATIENT)
Age: 64
End: 2024-01-23

## 2024-01-23 LAB
25(OH)D3 SERPL-MCNC: 22.2 NG/ML
ALBUMIN SERPL ELPH-MCNC: 4.4 G/DL
ALP BLD-CCNC: 88 U/L
ALT SERPL-CCNC: 13 U/L
ANION GAP SERPL CALC-SCNC: 14 MMOL/L
AST SERPL-CCNC: 17 U/L
BILIRUB SERPL-MCNC: 0.5 MG/DL
BUN SERPL-MCNC: 18 MG/DL
CALCIUM SERPL-MCNC: 9.8 MG/DL
CHLORIDE SERPL-SCNC: 104 MMOL/L
CHOLEST SERPL-MCNC: 259 MG/DL
CO2 SERPL-SCNC: 24 MMOL/L
CREAT SERPL-MCNC: 1.33 MG/DL
CREAT SPEC-SCNC: 88 MG/DL
EGFR: 60 ML/MIN/1.73M2
GLUCOSE SERPL-MCNC: 214 MG/DL
HDLC SERPL-MCNC: 54 MG/DL
LDLC SERPL CALC-MCNC: 180 MG/DL
MICROALBUMIN 24H UR DL<=1MG/L-MCNC: 60.5 MG/DL
MICROALBUMIN/CREAT 24H UR-RTO: 691 MG/G
NONHDLC SERPL-MCNC: 205 MG/DL
POTASSIUM SERPL-SCNC: 4.1 MMOL/L
PROT SERPL-MCNC: 7.6 G/DL
SODIUM SERPL-SCNC: 142 MMOL/L
T4 FREE SERPL-MCNC: 1.3 NG/DL
TRIGL SERPL-MCNC: 138 MG/DL
TSH SERPL-ACNC: 3.03 UIU/ML
VIT B12 SERPL-MCNC: 864 PG/ML

## 2024-01-23 RX ORDER — TIRZEPATIDE 5 MG/.5ML
5 INJECTION, SOLUTION SUBCUTANEOUS
Qty: 1 | Refills: 1 | Status: ACTIVE | COMMUNITY
Start: 2024-01-19 | End: 1900-01-01

## 2024-01-23 RX ORDER — LOSARTAN POTASSIUM 100 MG/1
100 TABLET, FILM COATED ORAL DAILY
Qty: 90 | Refills: 1 | Status: DISCONTINUED | COMMUNITY
Start: 2021-01-28 | End: 2024-01-23

## 2024-01-23 RX ORDER — METFORMIN HYDROCHLORIDE 500 MG/1
500 TABLET, COATED ORAL TWICE DAILY
Qty: 360 | Refills: 1 | Status: DISCONTINUED | COMMUNITY
Start: 2022-10-12 | End: 2024-01-23

## 2024-01-26 RX ORDER — INSULIN GLARGINE 100 [IU]/ML
100 INJECTION, SOLUTION SUBCUTANEOUS
Qty: 4 | Refills: 0 | Status: ACTIVE | COMMUNITY
Start: 2022-10-26 | End: 1900-01-01

## 2024-01-28 RX ORDER — GLIPIZIDE 10 MG/1
10 TABLET, FILM COATED, EXTENDED RELEASE ORAL DAILY
Qty: 90 | Refills: 0 | Status: ACTIVE | COMMUNITY
Start: 2021-11-11 | End: 1900-01-01

## 2024-01-28 RX ORDER — METFORMIN HYDROCHLORIDE 500 MG/1
500 TABLET, FILM COATED, EXTENDED RELEASE ORAL
Qty: 180 | Refills: 0 | Status: ACTIVE | COMMUNITY
Start: 2021-11-11 | End: 1900-01-01

## 2024-01-28 RX ORDER — ATORVASTATIN CALCIUM 40 MG/1
40 TABLET, FILM COATED ORAL
Qty: 90 | Refills: 0 | Status: ACTIVE | COMMUNITY
Start: 2021-01-28 | End: 1900-01-01

## 2024-02-01 ENCOUNTER — TRANSCRIPTION ENCOUNTER (OUTPATIENT)
Age: 64
End: 2024-02-01

## 2024-07-03 ENCOUNTER — APPOINTMENT (OUTPATIENT)
Dept: ENDOCRINOLOGY | Facility: CLINIC | Age: 64
End: 2024-07-03

## 2024-07-08 ENCOUNTER — APPOINTMENT (OUTPATIENT)
Dept: ENDOCRINOLOGY | Facility: CLINIC | Age: 64
End: 2024-07-08
Payer: COMMERCIAL

## 2024-07-08 VITALS
HEART RATE: 84 BPM | HEIGHT: 70 IN | WEIGHT: 220 LBS | SYSTOLIC BLOOD PRESSURE: 124 MMHG | OXYGEN SATURATION: 98 % | DIASTOLIC BLOOD PRESSURE: 84 MMHG | BODY MASS INDEX: 31.5 KG/M2

## 2024-07-08 DIAGNOSIS — E03.8 OTHER SPECIFIED HYPOTHYROIDISM: ICD-10-CM

## 2024-07-08 DIAGNOSIS — E55.9 VITAMIN D DEFICIENCY, UNSPECIFIED: ICD-10-CM

## 2024-07-08 DIAGNOSIS — E11.9 TYPE 2 DIABETES MELLITUS W/OUT COMPLICATIONS: ICD-10-CM

## 2024-07-08 DIAGNOSIS — I10 ESSENTIAL (PRIMARY) HYPERTENSION: ICD-10-CM

## 2024-07-08 DIAGNOSIS — E53.8 DEFICIENCY OF OTHER SPECIFIED B GROUP VITAMINS: ICD-10-CM

## 2024-07-08 DIAGNOSIS — E78.5 HYPERLIPIDEMIA, UNSPECIFIED: ICD-10-CM

## 2024-07-08 LAB — HBA1C MFR BLD HPLC: 8.3

## 2024-07-08 PROCEDURE — 83036 HEMOGLOBIN GLYCOSYLATED A1C: CPT | Mod: QW

## 2024-07-08 PROCEDURE — G2211 COMPLEX E/M VISIT ADD ON: CPT

## 2024-07-08 PROCEDURE — 95251 CONT GLUC MNTR ANALYSIS I&R: CPT

## 2024-07-08 PROCEDURE — 99214 OFFICE O/P EST MOD 30 MIN: CPT

## 2024-11-11 ENCOUNTER — NON-APPOINTMENT (OUTPATIENT)
Age: 64
End: 2024-11-11

## 2024-11-11 ENCOUNTER — APPOINTMENT (OUTPATIENT)
Dept: ENDOCRINOLOGY | Facility: CLINIC | Age: 64
End: 2024-11-11
Payer: COMMERCIAL

## 2024-11-11 VITALS
DIASTOLIC BLOOD PRESSURE: 80 MMHG | SYSTOLIC BLOOD PRESSURE: 118 MMHG | HEART RATE: 91 BPM | OXYGEN SATURATION: 99 % | HEIGHT: 70 IN | WEIGHT: 197 LBS | BODY MASS INDEX: 28.2 KG/M2

## 2024-11-11 DIAGNOSIS — E55.9 VITAMIN D DEFICIENCY, UNSPECIFIED: ICD-10-CM

## 2024-11-11 DIAGNOSIS — E53.8 DEFICIENCY OF OTHER SPECIFIED B GROUP VITAMINS: ICD-10-CM

## 2024-11-11 DIAGNOSIS — E78.5 HYPERLIPIDEMIA, UNSPECIFIED: ICD-10-CM

## 2024-11-11 DIAGNOSIS — E11.9 TYPE 2 DIABETES MELLITUS W/OUT COMPLICATIONS: ICD-10-CM

## 2024-11-11 LAB
GLUCOSE BLDC GLUCOMTR-MCNC: 220
HBA1C MFR BLD HPLC: 8.3

## 2024-11-11 PROCEDURE — G2211 COMPLEX E/M VISIT ADD ON: CPT | Mod: NC

## 2024-11-11 PROCEDURE — 99214 OFFICE O/P EST MOD 30 MIN: CPT

## 2024-11-11 PROCEDURE — 95251 CONT GLUC MNTR ANALYSIS I&R: CPT

## 2024-11-11 PROCEDURE — 82962 GLUCOSE BLOOD TEST: CPT

## 2024-11-11 PROCEDURE — 83036 HEMOGLOBIN GLYCOSYLATED A1C: CPT | Mod: QW

## 2024-11-11 RX ORDER — TIRZEPATIDE 7.5 MG/.5ML
7.5 INJECTION, SOLUTION SUBCUTANEOUS
Qty: 1 | Refills: 0 | Status: ACTIVE | COMMUNITY
Start: 2024-11-11 | End: 1900-01-01

## 2024-12-05 ENCOUNTER — APPOINTMENT (OUTPATIENT)
Dept: ENDOCRINOLOGY | Facility: CLINIC | Age: 64
End: 2024-12-05

## 2024-12-13 ENCOUNTER — RX RENEWAL (OUTPATIENT)
Age: 64
End: 2024-12-13

## 2025-01-13 ENCOUNTER — APPOINTMENT (OUTPATIENT)
Dept: ENDOCRINOLOGY | Facility: CLINIC | Age: 65
End: 2025-01-13

## 2025-02-18 ENCOUNTER — RX RENEWAL (OUTPATIENT)
Age: 65
End: 2025-02-18

## 2025-07-31 ENCOUNTER — APPOINTMENT (OUTPATIENT)
Dept: ENDOCRINOLOGY | Facility: CLINIC | Age: 65
End: 2025-07-31